# Patient Record
Sex: MALE | Race: WHITE | Employment: OTHER | URBAN - METROPOLITAN AREA
[De-identification: names, ages, dates, MRNs, and addresses within clinical notes are randomized per-mention and may not be internally consistent; named-entity substitution may affect disease eponyms.]

---

## 2017-01-13 ENCOUNTER — OFFICE VISIT (OUTPATIENT)
Dept: FAMILY MEDICINE CLINIC | Age: 82
End: 2017-01-13

## 2017-01-13 VITALS
HEIGHT: 67 IN | WEIGHT: 192 LBS | SYSTOLIC BLOOD PRESSURE: 114 MMHG | DIASTOLIC BLOOD PRESSURE: 64 MMHG | BODY MASS INDEX: 30.13 KG/M2 | TEMPERATURE: 98.1 F

## 2017-01-13 DIAGNOSIS — Z23 NEED FOR PNEUMOCOCCAL VACCINATION: ICD-10-CM

## 2017-01-13 DIAGNOSIS — E78.00 PURE HYPERCHOLESTEROLEMIA: ICD-10-CM

## 2017-01-13 DIAGNOSIS — I10 ESSENTIAL HYPERTENSION: Primary | ICD-10-CM

## 2017-01-13 DIAGNOSIS — I48.20 CHRONIC ATRIAL FIBRILLATION (HCC): ICD-10-CM

## 2017-01-13 PROCEDURE — 99214 OFFICE O/P EST MOD 30 MIN: CPT | Performed by: INTERNAL MEDICINE

## 2017-01-13 PROCEDURE — 90670 PCV13 VACCINE IM: CPT | Performed by: INTERNAL MEDICINE

## 2017-01-13 PROCEDURE — G0009 ADMIN PNEUMOCOCCAL VACCINE: HCPCS | Performed by: INTERNAL MEDICINE

## 2017-01-13 ASSESSMENT — PATIENT HEALTH QUESTIONNAIRE - PHQ9
SUM OF ALL RESPONSES TO PHQ QUESTIONS 1-9: 0
2. FEELING DOWN, DEPRESSED OR HOPELESS: 0
SUM OF ALL RESPONSES TO PHQ9 QUESTIONS 1 & 2: 0
1. LITTLE INTEREST OR PLEASURE IN DOING THINGS: 0

## 2017-01-13 ASSESSMENT — ENCOUNTER SYMPTOMS
VOMITING: 0
APNEA: 0
SHORTNESS OF BREATH: 0
CONSTIPATION: 0
WHEEZING: 0
BLOOD IN STOOL: 0
COUGH: 0
ABDOMINAL PAIN: 0
NAUSEA: 0

## 2017-02-01 DIAGNOSIS — E78.00 PURE HYPERCHOLESTEROLEMIA: ICD-10-CM

## 2017-02-01 DIAGNOSIS — I10 ESSENTIAL HYPERTENSION: ICD-10-CM

## 2017-02-01 LAB
ALT SERPL-CCNC: 24 U/L (ref 10–40)
ANION GAP SERPL CALCULATED.3IONS-SCNC: 10 MMOL/L (ref 3–16)
AST SERPL-CCNC: 22 U/L (ref 15–37)
BUN BLDV-MCNC: 19 MG/DL (ref 7–20)
CALCIUM SERPL-MCNC: 9 MG/DL (ref 8.3–10.6)
CHLORIDE BLD-SCNC: 105 MMOL/L (ref 99–110)
CHOLESTEROL, TOTAL: 134 MG/DL (ref 0–199)
CO2: 27 MMOL/L (ref 21–32)
CREAT SERPL-MCNC: 1.2 MG/DL (ref 0.8–1.3)
GFR AFRICAN AMERICAN: >60
GFR NON-AFRICAN AMERICAN: 57
GLUCOSE BLD-MCNC: 90 MG/DL (ref 70–99)
HDLC SERPL-MCNC: 50 MG/DL (ref 40–60)
LDL CHOLESTEROL CALCULATED: 56 MG/DL
POTASSIUM SERPL-SCNC: 4.4 MMOL/L (ref 3.5–5.1)
SODIUM BLD-SCNC: 142 MMOL/L (ref 136–145)
TRIGL SERPL-MCNC: 139 MG/DL (ref 0–150)
VLDLC SERPL CALC-MCNC: 28 MG/DL

## 2017-03-23 ENCOUNTER — TELEPHONE (OUTPATIENT)
Dept: FAMILY MEDICINE CLINIC | Age: 82
End: 2017-03-23

## 2017-04-25 ENCOUNTER — TELEPHONE (OUTPATIENT)
Dept: FAMILY MEDICINE CLINIC | Age: 82
End: 2017-04-25

## 2017-06-02 ENCOUNTER — TELEPHONE (OUTPATIENT)
Dept: FAMILY MEDICINE CLINIC | Age: 82
End: 2017-06-02

## 2017-07-10 ENCOUNTER — OFFICE VISIT (OUTPATIENT)
Dept: FAMILY MEDICINE CLINIC | Age: 82
End: 2017-07-10

## 2017-07-10 VITALS
TEMPERATURE: 98 F | BODY MASS INDEX: 30.1 KG/M2 | DIASTOLIC BLOOD PRESSURE: 72 MMHG | SYSTOLIC BLOOD PRESSURE: 110 MMHG | WEIGHT: 191.8 LBS | HEIGHT: 67 IN

## 2017-07-10 DIAGNOSIS — E78.00 PURE HYPERCHOLESTEROLEMIA: Primary | ICD-10-CM

## 2017-07-10 DIAGNOSIS — R53.82 CHRONIC FATIGUE: ICD-10-CM

## 2017-07-10 PROCEDURE — 99213 OFFICE O/P EST LOW 20 MIN: CPT | Performed by: INTERNAL MEDICINE

## 2017-07-10 ASSESSMENT — ENCOUNTER SYMPTOMS
WHEEZING: 0
COUGH: 0
APNEA: 0
ABDOMINAL PAIN: 0

## 2017-07-28 PROBLEM — I48.92 ATRIAL FLUTTER WITH RAPID VENTRICULAR RESPONSE (HCC): Status: ACTIVE | Noted: 2017-07-28

## 2017-07-31 ENCOUNTER — ANTI-COAG VISIT (OUTPATIENT)
Dept: CARDIOLOGY CLINIC | Age: 82
End: 2017-07-31

## 2017-08-01 ENCOUNTER — TELEPHONE (OUTPATIENT)
Dept: FAMILY MEDICINE CLINIC | Age: 82
End: 2017-08-01

## 2017-08-03 ENCOUNTER — TELEPHONE (OUTPATIENT)
Dept: CARDIOLOGY CLINIC | Age: 82
End: 2017-08-03

## 2017-08-03 DIAGNOSIS — I48.0 PAF (PAROXYSMAL ATRIAL FIBRILLATION) (HCC): ICD-10-CM

## 2017-08-03 DIAGNOSIS — I25.10 CORONARY ARTERY DISEASE INVOLVING NATIVE CORONARY ARTERY OF NATIVE HEART WITHOUT ANGINA PECTORIS: ICD-10-CM

## 2017-08-03 LAB
ANION GAP SERPL CALCULATED.3IONS-SCNC: 10 MMOL/L (ref 3–16)
BUN BLDV-MCNC: 33 MG/DL (ref 7–20)
CALCIUM SERPL-MCNC: 9.4 MG/DL (ref 8.3–10.6)
CHLORIDE BLD-SCNC: 104 MMOL/L (ref 99–110)
CO2: 27 MMOL/L (ref 21–32)
CREAT SERPL-MCNC: 1.3 MG/DL (ref 0.8–1.3)
GFR AFRICAN AMERICAN: >60
GFR NON-AFRICAN AMERICAN: 52
GLUCOSE BLD-MCNC: 86 MG/DL (ref 70–99)
INR BLD: 2.25 (ref 0.85–1.15)
POTASSIUM SERPL-SCNC: 4.6 MMOL/L (ref 3.5–5.1)
PROTHROMBIN TIME: 25.4 SEC (ref 9.6–13)
SODIUM BLD-SCNC: 141 MMOL/L (ref 136–145)

## 2017-08-14 ENCOUNTER — OFFICE VISIT (OUTPATIENT)
Dept: FAMILY MEDICINE CLINIC | Age: 82
End: 2017-08-14

## 2017-08-14 VITALS
WEIGHT: 192.2 LBS | SYSTOLIC BLOOD PRESSURE: 122 MMHG | DIASTOLIC BLOOD PRESSURE: 64 MMHG | BODY MASS INDEX: 29.13 KG/M2 | HEIGHT: 68 IN | TEMPERATURE: 97.5 F

## 2017-08-14 DIAGNOSIS — I48.92 ATRIAL FLUTTER WITH RAPID VENTRICULAR RESPONSE (HCC): Primary | ICD-10-CM

## 2017-08-14 PROCEDURE — 99213 OFFICE O/P EST LOW 20 MIN: CPT | Performed by: INTERNAL MEDICINE

## 2017-08-14 ASSESSMENT — ENCOUNTER SYMPTOMS
COUGH: 0
WHEEZING: 0
ABDOMINAL PAIN: 0
APNEA: 0
SHORTNESS OF BREATH: 0

## 2017-08-17 ENCOUNTER — ANTI-COAG VISIT (OUTPATIENT)
Dept: CARDIOLOGY CLINIC | Age: 82
End: 2017-08-17

## 2017-08-17 DIAGNOSIS — I48.0 PAF (PAROXYSMAL ATRIAL FIBRILLATION) (HCC): ICD-10-CM

## 2017-08-17 DIAGNOSIS — I48.0 PAF (PAROXYSMAL ATRIAL FIBRILLATION) (HCC): Primary | ICD-10-CM

## 2017-08-17 LAB
INR BLD: 2.38 (ref 0.85–1.15)
PROTHROMBIN TIME: 26.9 SEC (ref 9.6–13)

## 2017-08-18 ENCOUNTER — TELEPHONE (OUTPATIENT)
Dept: CARDIOLOGY CLINIC | Age: 82
End: 2017-08-18

## 2017-08-22 ENCOUNTER — TELEPHONE (OUTPATIENT)
Dept: FAMILY MEDICINE CLINIC | Age: 82
End: 2017-08-22

## 2017-08-22 ENCOUNTER — OFFICE VISIT (OUTPATIENT)
Dept: CARDIOLOGY CLINIC | Age: 82
End: 2017-08-22

## 2017-08-22 VITALS
HEIGHT: 68 IN | WEIGHT: 191 LBS | BODY MASS INDEX: 28.95 KG/M2 | DIASTOLIC BLOOD PRESSURE: 64 MMHG | SYSTOLIC BLOOD PRESSURE: 126 MMHG | HEART RATE: 96 BPM | OXYGEN SATURATION: 98 %

## 2017-08-22 DIAGNOSIS — I48.0 PAF (PAROXYSMAL ATRIAL FIBRILLATION) (HCC): Primary | ICD-10-CM

## 2017-08-22 DIAGNOSIS — I10 ESSENTIAL HYPERTENSION: ICD-10-CM

## 2017-08-22 DIAGNOSIS — E78.5 HYPERLIPIDEMIA, UNSPECIFIED HYPERLIPIDEMIA TYPE: ICD-10-CM

## 2017-08-22 DIAGNOSIS — I48.19 PERSISTENT ATRIAL FIBRILLATION (HCC): ICD-10-CM

## 2017-08-22 DIAGNOSIS — I25.10 CORONARY ARTERY DISEASE INVOLVING NATIVE CORONARY ARTERY OF NATIVE HEART WITHOUT ANGINA PECTORIS: ICD-10-CM

## 2017-08-22 PROCEDURE — 93000 ELECTROCARDIOGRAM COMPLETE: CPT | Performed by: INTERNAL MEDICINE

## 2017-08-22 PROCEDURE — 99214 OFFICE O/P EST MOD 30 MIN: CPT | Performed by: INTERNAL MEDICINE

## 2017-08-22 RX ORDER — METOPROLOL TARTRATE 50 MG/1
25 TABLET, FILM COATED ORAL 2 TIMES DAILY
Qty: 30 TABLET | Refills: 0 | Status: SHIPPED | OUTPATIENT
Start: 2017-08-22 | End: 2017-10-20 | Stop reason: DRUGHIGH

## 2017-08-25 RX ORDER — PRAVASTATIN SODIUM 10 MG
TABLET ORAL
Qty: 30 TABLET | Refills: 5 | Status: SHIPPED | OUTPATIENT
Start: 2017-08-25 | End: 2018-02-13 | Stop reason: ALTCHOICE

## 2017-09-14 ENCOUNTER — TELEPHONE (OUTPATIENT)
Dept: CARDIOLOGY CLINIC | Age: 82
End: 2017-09-14

## 2017-09-14 DIAGNOSIS — I48.0 PAF (PAROXYSMAL ATRIAL FIBRILLATION) (HCC): ICD-10-CM

## 2017-09-14 LAB
INR BLD: 3.1 (ref 0.85–1.15)
PROTHROMBIN TIME: 35 SEC (ref 9.6–13)

## 2017-09-15 ENCOUNTER — ANTI-COAG VISIT (OUTPATIENT)
Dept: CARDIOLOGY CLINIC | Age: 82
End: 2017-09-15

## 2017-09-15 DIAGNOSIS — I48.19 PERSISTENT ATRIAL FIBRILLATION (HCC): ICD-10-CM

## 2017-09-20 ENCOUNTER — CARE COORDINATION (OUTPATIENT)
Dept: CASE MANAGEMENT | Age: 82
End: 2017-09-20

## 2017-09-21 DIAGNOSIS — I48.0 PAF (PAROXYSMAL ATRIAL FIBRILLATION) (HCC): ICD-10-CM

## 2017-09-21 LAB
INR BLD: 2.24 (ref 0.85–1.15)
PROTHROMBIN TIME: 25.3 SEC (ref 9.6–13)

## 2017-09-22 ENCOUNTER — TELEPHONE (OUTPATIENT)
Dept: CARDIOLOGY CLINIC | Age: 82
End: 2017-09-22

## 2017-09-22 ENCOUNTER — ANTI-COAG VISIT (OUTPATIENT)
Dept: CARDIOLOGY CLINIC | Age: 82
End: 2017-09-22

## 2017-09-22 DIAGNOSIS — I48.19 PERSISTENT ATRIAL FIBRILLATION (HCC): ICD-10-CM

## 2017-09-25 ENCOUNTER — CARE COORDINATION (OUTPATIENT)
Dept: MEDSURG UNIT | Age: 82
End: 2017-09-25

## 2017-09-26 ENCOUNTER — OFFICE VISIT (OUTPATIENT)
Dept: FAMILY MEDICINE CLINIC | Age: 82
End: 2017-09-26

## 2017-09-26 VITALS
HEIGHT: 68 IN | SYSTOLIC BLOOD PRESSURE: 120 MMHG | WEIGHT: 194.8 LBS | BODY MASS INDEX: 29.52 KG/M2 | DIASTOLIC BLOOD PRESSURE: 72 MMHG | TEMPERATURE: 97.8 F

## 2017-09-26 DIAGNOSIS — I48.0 PAF (PAROXYSMAL ATRIAL FIBRILLATION) (HCC): Primary | ICD-10-CM

## 2017-09-26 DIAGNOSIS — Z23 FLU VACCINE NEED: ICD-10-CM

## 2017-09-26 PROCEDURE — 90662 IIV NO PRSV INCREASED AG IM: CPT | Performed by: INTERNAL MEDICINE

## 2017-09-26 PROCEDURE — G0008 ADMIN INFLUENZA VIRUS VAC: HCPCS | Performed by: INTERNAL MEDICINE

## 2017-09-26 PROCEDURE — 99213 OFFICE O/P EST LOW 20 MIN: CPT | Performed by: INTERNAL MEDICINE

## 2017-09-26 ASSESSMENT — ENCOUNTER SYMPTOMS
APNEA: 0
COUGH: 0
ABDOMINAL PAIN: 0

## 2017-09-28 ENCOUNTER — CARE COORDINATION (OUTPATIENT)
Dept: MEDSURG UNIT | Age: 82
End: 2017-09-28

## 2017-10-03 ENCOUNTER — CARE COORDINATION (OUTPATIENT)
Dept: CASE MANAGEMENT | Age: 82
End: 2017-10-03

## 2017-10-03 NOTE — CARE COORDINATION
[facility] 45 Transitions Follow Up Call    10/3/2017    Patient: Gisela Coles  Patient : 1926   MRN: <[id number]>  Reason for Admission: There are no discharge diagnoses documented for the most recent discharge. Discharge Date: 17 RARS: Risk Score: 18.25       Spoke with: [address] Transitions Subsequent and Final Call    Subsequent and Final Calls   Have your medications changed?:  No   Do you have any questions related to your medications?:  No   Do you currently have any active services?:  No   Do you have any needs or concerns that I can assist you with?:  Yes   Patient-reported Needs or Concerns:  red spot on arm   Identified Barriers:  None   Care Transitions Interventions   No Identified Needs   Other Interventions: Follow Up : Contacted patient for follow up CTC call. Pt doing well, no questions regarding plan of care or medications. Pt did mention that he noticed a \"red lump\" on his R forearm that had not been there a few days ago. Pt reports that the area is swollen, red, and warm to touch. He stated that his whole lower arm on the right side looked swollen the more he looked at it. Pt denies fever at this time. Appt scheduled with PCP to eval arm tomorrow. Pt verbalized understanding of appt date, time, and location.      Future Appointments  Date Time Provider Vishal Kirk   10/4/2017 10:00 AM [facility]   10/20/2017 3:45 PM Naresh Gonzales MD [facility] NATHAN Rico RN

## 2017-10-04 ENCOUNTER — OFFICE VISIT (OUTPATIENT)
Dept: FAMILY MEDICINE CLINIC | Age: 82
End: 2017-10-04

## 2017-10-04 VITALS
TEMPERATURE: 98 F | WEIGHT: 194.4 LBS | BODY MASS INDEX: 29.46 KG/M2 | DIASTOLIC BLOOD PRESSURE: 74 MMHG | SYSTOLIC BLOOD PRESSURE: 122 MMHG | HEIGHT: 68 IN

## 2017-10-04 DIAGNOSIS — T14.8XXA HEMATOMA: ICD-10-CM

## 2017-10-04 PROCEDURE — 99213 OFFICE O/P EST LOW 20 MIN: CPT | Performed by: INTERNAL MEDICINE

## 2017-10-04 ASSESSMENT — ENCOUNTER SYMPTOMS
ABDOMINAL PAIN: 0
APNEA: 0
SHORTNESS OF BREATH: 0
WHEEZING: 0

## 2017-10-04 NOTE — PROGRESS NOTES
Subjective:      Patient ID: Audelia Severin is a 80 y.o. male. HPI   Chief Complaint   Patient presents with    Mass     patient c/o \"lump\" and swelling- right arm x 1 week. patient denies injury   present since iv infiltration  Audelia Severin is a 80 y.o. male with the following history as recorded in EpicCare:  Patient Active Problem List    Diagnosis Date Noted    PAF [facility]oxysmal [facility] fibrillation) ([facility]      Priority: High    [facility]     Atrial flutter with rapid ventricular response ([facility] 07/28/2017    Chronic fatigue 07/10/2017    Non-healing skin lesion of nose 04/01/2016    Glaucoma     Gross hematuria 09/30/2014    Cataract 04/23/2014    Hyperlipidemia 12/27/2013    [facility]     Pers[facility]     Essential hypertension     Aorta aneurysm (HCC)     Aorta aneurysm (HCC)     Coronary artery disease involving native coronary artery of native heart without angina pectoris 07/20/2010     Current Outpatient Prescriptions   Medication Sig Dispense Refill    amiodarone (CORDARONE) 200 MG tablet Take 1 tablet by mouth daily 30 tablet 3    warfarin (COUMADIN) 5 MG tablet Indications: managed by Cardiologist Hold dose today and then resume at 2.5 mg daily (0.5 tablet) 30 tablet 3    pravastatin (PRAVACHOL) 10 MG tablet take 1 by mouth daily 30 tablet 5    Cholecalciferol (VITAMIN D PO) Take 5,000 Int'l Units by mouth daily      Multiple Vitamins-Minerals (EYE VITAMINS PO) Take by mouth daily      metoprolol tartrate (LOPRESSOR) 50 MG tablet Take 0.5 tablets by mouth 2 times daily (Patient taking differently: Take 50 mg by mouth 2 times daily ) 30 tablet 0    Handicap Placard MISC by Does not apply route 1 each 0    clopidogrel (PLAVIX) 75 MG tablet Take 75 mg by mouth daily.  nitroGLYCERIN (NITROSTAT) 0.4 MG SL tablet Place 0.4 mg under the tongue every 5 minutes as needed.         timolol (TIMOPTIC) 0.5 %

## 2017-10-04 NOTE — MR AVS SNAPSHOT
After Visit Summary             Rainey Mcardle   10/4/2017 10:00 AM   Office Visit    Description:  Male : 1926   Provider:  Natalia Mijares DO   Department:  [facility]              Your Follow-Up and Future Appointments         Below is a list of your follow-up and future appointments. This may not be a complete list as you may have made appointments directly with providers that we are not aware of or your providers may have made some for you. Please call your providers to confirm appointments. It is important to keep your appointments. Please bring your current insurance card, photo ID, co-pay, and all medication bottles to your appointment. If self-pay, payment is expected at the time of service. Your To-Do List     Future Appointments Provider Department Dept Phone    10/20/2017 3:45 PM Emerson Roland MD [address] [place] [address] [phone]    Please arrive 15 minutes prior to appointment time, bring insurance card and photo ID. Information from Your Visit        Department     Name Address Phone Fax    [facility] [address] [address] [phone]      You Were Seen for:         Comments    Hematoma   [[id number]]         Vital Signs     Blood Pressure Temperature Height Weight Body Mass Index Smoking Status    122/74 (Site: Left Arm, Position: Sitting, Cuff Size: Large Adult) 98 °F (36.7 °C) (Oral) 5' 8\" (1.727 m) 194 lb 6.4 oz (88.2 kg) 29.56 kg/m2 Never Smoker      Additional Information about your Body Mass Index (BMI)           Your BMI as listed above is considered overweight (25.0-29.9). BMI is an estimate of body fat, calculated from your height and weight. The higher your BMI, the greater your risk of heart disease, high blood pressure, type 2 diabetes, stroke, gallstones, arthritis, sleep apnea, and certain cancers. BMI is not perfect.   It may overestimate body fat in athletes and people who are more muscular. If your body fat is high you can improve your BMI by decreasing your calorie intake and becoming more physically active. Learn more at: [employer]             Medications and Orders      Your Current Medications Are              amiodarone (CORDARONE) 200 MG tablet Take 1 tablet by mouth daily    warfarin (COUMADIN) 5 MG tablet Indications: managed by Cardiologist Hold dose today and then resume at 2.5 mg daily (0.5 tablet)    pravastatin (PRAVACHOL) 10 MG tablet take 1 by mouth daily    Cholecalciferol (VITAMIN D PO) Take 5,000 Int'l Units by mouth daily    Multiple Vitamins-Minerals (EYE VITAMINS PO) Take by mouth daily    metoprolol tartrate (LOPRESSOR) 50 MG tablet Take 0.5 tablets by mouth 2 times daily    Handicap Placard MISC by Does not apply route    clopidogrel (PLAVIX) 75 MG tablet Take 75 mg by mouth daily. nitroGLYCERIN (NITROSTAT) 0.4 MG SL tablet Place 0.4 mg under the tongue every 5 minutes as needed. timolol (TIMOPTIC) 0.5 % ophthalmic solution 1 drop daily.       Allergies              Lipitor [Atorvastatin]     Sulfa Antibiotics          Additional Information        Basic Information     Date Of Birth Sex Race Ethnicity Preferred Language    1/11/1926 Male White Non-/Non  English      Problem List as of 10/4/2017  Date Reviewed: 9/26/2017                PAF (paroxysmal atrial fibrillation) (HCC)    Hematoma    Atrial fibrillation with RVR (HCC)    Atrial flutter with rapid ventricular response (HCC)    Chronic fatigue    Non-healing skin lesion of nose    Glaucoma    Gross hematuria    Cataract    Hyperlipidemia    Cancer of prostate ([facility]    Persistent atrial fibrillation [facility])    Essential hypertension    Aorta aneurysm ([facility]    Aorta aneurysm ([facility]    Coronary artery disease involving native coronary artery of native heart without angina pectoris      Immunizations as of 10/4/2017     Name Date Influenza Vaccine, unspecified formulation 10/16/2015    Influenza, High Dose 9/26/2017, 12/1/2016    Pneumococcal 13-valent Conjugate (Cavrbpb19) 1/13/2017    Pneumococcal Polysaccharide ([id number]) 12/2/2011      Preventive Care        Date Due    Tetanus Combination Vaccine (1 - Tdap) 1/13/2018 (Originally 1/11/1945)            Sushma Finn allows you to send messages to your doctor, view your test results, renew your prescriptions, schedule appointments, view visit notes, and more. How Do I Sign Up? 1. In your Internet browser, go to [email or web address]  2. Click on the Sign Up Now link in the Sign In box. You will see the New Member Sign Up page. 3. Enter your [employer] Access Code exactly as it appears below. You will not need to use this code after youve completed the sign-up process. If you do not sign up before the expiration date, you must request a new code. [employer] Access Code: [email or web address]  Expires: 11/18/2017 12:18 PM    4. Enter your Social Security Number ([id number]) and Date of Birth (mm/dd/yyyy) as indicated and click Submit. You will be taken to the next sign-up page. 5. Create a [employer] ID. This will be your [employer] login ID and cannot be changed, so think of one that is secure and easy to remember. 6. Create a [employer] password. You can change your password at any time. 7. Enter your Password Reset Question and Answer. This can be used at a later time if you forget your password. 8. Enter your e-mail address. You will receive e-mail notification when new information is available in [address] 9. Click Sign Up. You can now view your medical record. Additional Information  If you have questions, please contact the physician practice where you receive care. Remember, [employer] is NOT to be used for urgent needs. For medical emergencies, dial 911. For questions regarding your [employer] account call [phone].  If you

## 2017-10-19 ENCOUNTER — ANTI-COAG VISIT (OUTPATIENT)
Dept: CARDIOLOGY CLINIC | Age: 82
End: 2017-10-19

## 2017-10-19 ENCOUNTER — TELEPHONE (OUTPATIENT)
Dept: CARDIOLOGY CLINIC | Age: 82
End: 2017-10-19

## 2017-10-19 DIAGNOSIS — I48.0 PAF (PAROXYSMAL ATRIAL FIBRILLATION) (HCC): ICD-10-CM

## 2017-10-19 LAB
INR BLD: 4.6 (ref 0.85–1.15)
PROTHROMBIN TIME: 52 SEC (ref 9.6–13)

## 2017-10-19 NOTE — TELEPHONE ENCOUNTER
Lab called in for a high INR reading on Dr. Grover [place] patient. INR - 4.60    Please call pt to advise. Thank you.

## 2017-10-20 ENCOUNTER — OFFICE VISIT (OUTPATIENT)
Dept: CARDIOLOGY CLINIC | Age: 82
End: 2017-10-20

## 2017-10-20 VITALS
SYSTOLIC BLOOD PRESSURE: 120 MMHG | HEART RATE: 54 BPM | OXYGEN SATURATION: 98 % | DIASTOLIC BLOOD PRESSURE: 68 MMHG | WEIGHT: 196 LBS | HEIGHT: 68 IN | BODY MASS INDEX: 29.7 KG/M2

## 2017-10-20 DIAGNOSIS — Z79.899 LONG TERM CURRENT USE OF AMIODARONE: ICD-10-CM

## 2017-10-20 DIAGNOSIS — I48.0 PAF (PAROXYSMAL ATRIAL FIBRILLATION) (HCC): Primary | ICD-10-CM

## 2017-10-20 DIAGNOSIS — I25.10 CORONARY ARTERY DISEASE INVOLVING NATIVE CORONARY ARTERY OF NATIVE HEART WITHOUT ANGINA PECTORIS: ICD-10-CM

## 2017-10-20 PROCEDURE — 1123F ACP DISCUSS/DSCN MKR DOCD: CPT | Performed by: INTERNAL MEDICINE

## 2017-10-20 PROCEDURE — 4040F PNEUMOC VAC/ADMIN/RCVD: CPT | Performed by: INTERNAL MEDICINE

## 2017-10-20 PROCEDURE — 93000 ELECTROCARDIOGRAM COMPLETE: CPT | Performed by: INTERNAL MEDICINE

## 2017-10-20 PROCEDURE — 1036F TOBACCO NON-USER: CPT | Performed by: INTERNAL MEDICINE

## 2017-10-20 PROCEDURE — G8427 DOCREV CUR MEDS BY ELIG CLIN: HCPCS | Performed by: INTERNAL MEDICINE

## 2017-10-20 PROCEDURE — G8484 FLU IMMUNIZE NO ADMIN: HCPCS | Performed by: INTERNAL MEDICINE

## 2017-10-20 PROCEDURE — G8417 CALC BMI ABV UP PARAM F/U: HCPCS | Performed by: INTERNAL MEDICINE

## 2017-10-20 PROCEDURE — G8598 ASA/ANTIPLAT THER USED: HCPCS | Performed by: INTERNAL MEDICINE

## 2017-10-20 PROCEDURE — 99214 OFFICE O/P EST MOD 30 MIN: CPT | Performed by: INTERNAL MEDICINE

## 2017-10-20 RX ORDER — METOPROLOL TARTRATE 50 MG/1
25 TABLET, FILM COATED ORAL 2 TIMES DAILY
Qty: 30 TABLET | Refills: 0 | Status: SHIPPED | OUTPATIENT
Start: 2017-10-20 | End: 2018-01-12 | Stop reason: SDUPTHER

## 2017-10-20 NOTE — PROGRESS NOTES
Medication Sig Dispense Refill    amiodarone (CORDARONE) 200 MG tablet Take 1 tablet by mouth daily 30 tablet 3    warfarin (COUMADIN) 5 MG tablet Indications: managed by Cardiologist Hold dose today and then resume at 2.5 mg daily (0.5 tablet) 30 tablet 3    pravastatin (PRAVACHOL) 10 MG tablet take 1 by mouth daily 30 tablet 5    Cholecalciferol (VITAMIN D PO) Take 5,000 Int'l Units by mouth daily      Multiple Vitamins-Minerals (EYE VITAMINS PO) Take by mouth daily      metoprolol tartrate (LOPRESSOR) 50 MG tablet Take 0.5 tablets by mouth 2 times daily (Patient taking differently: Take 50 mg by mouth 2 times daily ) 30 tablet 0    Handicap Placard MISC by Does not apply route 1 each 0    clopidogrel (PLAVIX) 75 MG tablet Take 75 mg by mouth daily.  nitroGLYCERIN (NITROSTAT) 0.4 MG SL tablet Place 0.4 mg under the tongue every 5 minutes as needed.  timolol (TIMOPTIC) 0.5 % ophthalmic solution 1 drop daily. No current facility-administered medications for this visit. Review of Systems:  Review of systems is as detailed above and otherwise all other systems are negative. Physical Exam:   /68   Pulse 54   Ht 5' 8\" (1.727 m)   Wt 196 lb (88.9 kg) Comment: did not want remove shoes  SpO2 98%   BMI 29.80 kg/m²   Wt Readings from Last 3 Encounters:   10/20/17 196 lb (88.9 kg)   10/04/17 194 lb 6.4 oz (88.2 kg)   09/26/17 194 lb 12.8 oz (88.4 kg)     Constitutional: He is oriented to person, place, and time. He appears well-developed and well-nourished. In no acute distress. Head: Normocephalic and atraumatic. Pupils equal and round. Neck: Neck supple. No JVP or carotid bruit appreciated. No mass and no thyromegaly present. No lymphadenopathy present. Cardiovascular: Irreg/Irregrate. Normal heart sounds. Exam reveals no gallop and no friction rub. No murmur heard. Pulmonary/Chest: Effort normal and breath sounds normal. No respiratory distress.  He has no wheezes, stable. Hyperlipidemia  Last lipid profile from 2/17 was WNL. LDL goal <70. Continue Pravastatin. Patient to follow up in 4 months. Thank you very much for allowing me to participate in the care of your patient. Please do not hesitate to contact me if you have any questions.     Sincerely,  Dipesh Reed MD      [facility], [address] [facility]  Ph: [phone]  Fax: [phone]

## 2017-10-20 NOTE — PATIENT INSTRUCTIONS
shot. If you have had one before, ask your doctor whether you need another dose. Get a flu shot every year. If you must be around people with colds or flu, wash your hands often. Activity  · If your doctor recommends it, get more exercise. Walking is a good choice. Bit by bit, increase the amount you walk every day. Try for at least 30 minutes on most days of the week. You also may want to swim, bike, or do other activities. Your doctor may suggest that you join a cardiac rehabilitation program so that you can have help increasing your physical activity safely. · Start light exercise if your doctor says it is okay. Even a small amount will help you get stronger, have more energy, and manage stress. Walking is an easy way to get exercise. Start out by walking a little more than you did in the hospital. Gradually increase the amount you walk. · When you exercise, watch for signs that your heart is working too hard. You are pushing too hard if you cannot talk while you are exercising. If you become short of breath or dizzy or have chest pain, sit down and rest immediately. · Check your pulse regularly. Place two fingers on the artery at the palm side of your wrist, in line with your thumb. If your heartbeat seems uneven or fast, talk to your doctor. When should you call for help? Call 911 anytime you think you may need emergency care. For example, call if:  · You have symptoms of a heart attack. These may include:  ¨ Chest pain or pressure, or a strange feeling in the chest.  ¨ Sweating. ¨ Shortness of breath. ¨ Nausea or vomiting. ¨ Pain, pressure, or a strange feeling in the back, neck, jaw, or upper belly or in one or both shoulders or arms. ¨ Lightheadedness or sudden weakness. ¨ A fast or irregular heartbeat. After you call 911, the  may tell you to chew 1 adult-strength or 2 to 4 low-dose aspirin. Wait for an ambulance. Do not try to drive yourself. · You have symptoms of a stroke.  These may

## 2017-10-23 DIAGNOSIS — I48.0 PAF (PAROXYSMAL ATRIAL FIBRILLATION) (HCC): ICD-10-CM

## 2017-10-23 LAB
INR BLD: 1.83 (ref 0.85–1.15)
PROTHROMBIN TIME: 20.7 SEC (ref 9.6–13)

## 2017-10-24 ENCOUNTER — TELEPHONE (OUTPATIENT)
Dept: CARDIOLOGY CLINIC | Age: 82
End: 2017-10-24

## 2017-10-31 ENCOUNTER — ANTI-COAG VISIT (OUTPATIENT)
Dept: PHARMACY | Facility: CLINIC | Age: 82
End: 2017-10-31

## 2017-10-31 DIAGNOSIS — I48.19 PERSISTENT ATRIAL FIBRILLATION (HCC): ICD-10-CM

## 2017-10-31 LAB — INR BLD: 3.7

## 2017-10-31 RX ORDER — WARFARIN SODIUM 5 MG/1
2.5 TABLET ORAL DAILY
COMMUNITY
End: 2018-02-09 | Stop reason: ALTCHOICE

## 2017-11-10 ENCOUNTER — ANTI-COAG VISIT (OUTPATIENT)
Dept: PHARMACY | Facility: CLINIC | Age: 82
End: 2017-11-10

## 2017-11-10 LAB
INR BLD: 3.4
PROTIME: 40.7 SECONDS

## 2017-11-17 ENCOUNTER — ANTI-COAG VISIT (OUTPATIENT)
Dept: PHARMACY | Facility: CLINIC | Age: 82
End: 2017-11-17

## 2017-11-17 LAB
INR BLD: 3.2
PROTIME: 38.2 SECONDS

## 2017-12-01 ENCOUNTER — TELEPHONE (OUTPATIENT)
Dept: CARDIOLOGY CLINIC | Age: 82
End: 2017-12-01

## 2017-12-01 ENCOUNTER — ANTI-COAG VISIT (OUTPATIENT)
Dept: PHARMACY | Facility: CLINIC | Age: 82
End: 2017-12-01

## 2017-12-01 ENCOUNTER — TELEPHONE (OUTPATIENT)
Dept: PHARMACY | Facility: CLINIC | Age: 82
End: 2017-12-01

## 2017-12-01 NOTE — TELEPHONE ENCOUNTER
Call to patient to discuss the INR lab draw from 11-30-17. Informed patient I have the results from yesterday's lab draw (2.8) and he does not need to come in today for his INR finger stick. Instructed patient to continue his current warfarin dosing of 2.5 mg daily except NO warfarin on Tuesday. Scheduled next appointment for 12-29-17 at 9:15 AM.  Instructed patient to continue having his vitamin k greens once weekly. Therapy Plan: if patient does not want to continue the vitamin K greens weekly then we may want to decrease dosing to 2.5 mg daily except 1.25 mg on M/W/F. He will need to change his warfarin tablet to 2.5 mg tablets instead of the 5 mg tablets he has at home.

## 2017-12-01 NOTE — TELEPHONE ENCOUNTER
Requested patient to return call to [facility] for dosing. He stated they already called him. Instructed patient to follow up with them. Patient verbalized and confirmed understanding.

## 2017-12-29 ENCOUNTER — ANTI-COAG VISIT (OUTPATIENT)
Dept: PHARMACY | Facility: CLINIC | Age: 82
End: 2017-12-29

## 2017-12-29 ENCOUNTER — TELEPHONE (OUTPATIENT)
Dept: PHARMACY | Facility: CLINIC | Age: 82
End: 2017-12-29

## 2017-12-29 LAB — INTERNATIONAL NORMALIZATION RATIO, POC: 3.2

## 2017-12-29 NOTE — TELEPHONE ENCOUNTER
Called and left message for patient regarding his taking CoQ10. Informed patient it would affect his warfarin dosing as well as his Plavix dosing. He should discuss this with his physician before continuing this OTC medication. Left #[phone] to call on Tuesday afternoon if he should have questions.

## 2017-12-29 NOTE — PROGRESS NOTES
Mr. Brown Bee is a 80 y.o.  male with history of persistent A-FIB who presents today for anticoagulation monitoring and adjustment. Patient verifies current dosing regimen  Patient denies s/s bleeding/bruising/swelling/SOB  No blood in urine or stool. No dietary changes. No changes in medication/OTC agents/Herbals. No change in alcohol use. No missed doses. No Procedures scheduled in the future at this time. Lab Results   Component Value Date    INR 3.2 12/29/2017    INR 2.81 (H) 11/30/2017    INR 3.2 11/17/2017     Patient states doing well. No complaints at this time. Reduced warfarin dose by 16 % to 2.5 mg daily except NO warfarin on Tuesdays and Fridays. Patient states he is looking into switching to [place]. We reviewed the differences between warfarin and Xeralto. He is going to research with his insurance company to determine the coverage before he discusses it with his MD.  To be seen again in three weeks. May need to change his tablet size to 2.5 mg tablets. After visit summary printed and reviewed with patient.

## 2018-01-15 RX ORDER — METOPROLOL TARTRATE 50 MG/1
TABLET, FILM COATED ORAL
Qty: 45 TABLET | Refills: 11 | Status: SHIPPED | OUTPATIENT
Start: 2018-01-15 | End: 2018-02-13 | Stop reason: SDUPTHER

## 2018-01-19 ENCOUNTER — ANTI-COAG VISIT (OUTPATIENT)
Dept: PHARMACY | Facility: CLINIC | Age: 83
End: 2018-01-19

## 2018-01-19 LAB — INTERNATIONAL NORMALIZATION RATIO, POC: 2.9

## 2018-01-19 NOTE — PROGRESS NOTES
Mr. Maura De Luna is a 80 y.o.  male with history of persistent A-Fib who presents today for anticoagulation monitoring and adjustment. Patient verifies current dosing regimen  Patient denies s/s bleeding/bruising/swelling/SOB  No blood in urine or stool. No dietary changes. No changes in medication/OTC agents/Herbals. No change in alcohol use. No missed doses. No Procedures scheduled in the future at this time. Lab Results   Component Value Date    INR 2.9 01/19/2018    INR 3.2 12/29/2017    INR 2.81 (H) 11/30/2017     Patient states doing well. Patient states he has had diarrhea for the past week or so. No noticeable blood in stool. Instructed him to call MD ( Dr. Dexter Solo) to inform. He may need to see MD.   He states he has not been eating his vitamin K greens once a week. Instructed him to continue to have a portion of these once weekly along with his [employer] salad on Saturdays. No change to warfarin weekly dosing of 2.5 mg daily EXCEPT no warfarin on Tuesday and Friday. He states he is not going to change to [place] after doing research on the drug. To be seen again in 4 weeks. After visit summary printed and reviewed with patient.

## 2018-01-23 ENCOUNTER — OFFICE VISIT (OUTPATIENT)
Dept: FAMILY MEDICINE CLINIC | Age: 83
End: 2018-01-23

## 2018-01-23 VITALS
HEIGHT: 68 IN | WEIGHT: 191 LBS | SYSTOLIC BLOOD PRESSURE: 124 MMHG | DIASTOLIC BLOOD PRESSURE: 78 MMHG | TEMPERATURE: 97.5 F | BODY MASS INDEX: 28.95 KG/M2

## 2018-01-23 DIAGNOSIS — R19.7 DIARRHEA OF PRESUMED INFECTIOUS ORIGIN: ICD-10-CM

## 2018-01-23 DIAGNOSIS — E78.5 HYPERLIPIDEMIA, UNSPECIFIED HYPERLIPIDEMIA TYPE: ICD-10-CM

## 2018-01-23 DIAGNOSIS — I10 ESSENTIAL HYPERTENSION: Primary | ICD-10-CM

## 2018-01-23 PROCEDURE — G8598 ASA/ANTIPLAT THER USED: HCPCS | Performed by: INTERNAL MEDICINE

## 2018-01-23 PROCEDURE — G8417 CALC BMI ABV UP PARAM F/U: HCPCS | Performed by: INTERNAL MEDICINE

## 2018-01-23 PROCEDURE — 1036F TOBACCO NON-USER: CPT | Performed by: INTERNAL MEDICINE

## 2018-01-23 PROCEDURE — G8427 DOCREV CUR MEDS BY ELIG CLIN: HCPCS | Performed by: INTERNAL MEDICINE

## 2018-01-23 PROCEDURE — 4040F PNEUMOC VAC/ADMIN/RCVD: CPT | Performed by: INTERNAL MEDICINE

## 2018-01-23 PROCEDURE — G8484 FLU IMMUNIZE NO ADMIN: HCPCS | Performed by: INTERNAL MEDICINE

## 2018-01-23 PROCEDURE — 99214 OFFICE O/P EST MOD 30 MIN: CPT | Performed by: INTERNAL MEDICINE

## 2018-01-23 PROCEDURE — 1123F ACP DISCUSS/DSCN MKR DOCD: CPT | Performed by: INTERNAL MEDICINE

## 2018-01-23 ASSESSMENT — ENCOUNTER SYMPTOMS
SHORTNESS OF BREATH: 0
CONSTIPATION: 0
RHINORRHEA: 0
BLOOD IN STOOL: 0
APNEA: 0
DIARRHEA: 1
ABDOMINAL DISTENTION: 0

## 2018-01-23 ASSESSMENT — PATIENT HEALTH QUESTIONNAIRE - PHQ9
2. FEELING DOWN, DEPRESSED OR HOPELESS: 0
SUM OF ALL RESPONSES TO PHQ9 QUESTIONS 1 & 2: 0
SUM OF ALL RESPONSES TO PHQ QUESTIONS 1-9: 0
1. LITTLE INTEREST OR PLEASURE IN DOING THINGS: 0

## 2018-01-23 NOTE — PROGRESS NOTES
mouth daily      Multiple Vitamins-Minerals (EYE VITAMINS PO) Take by mouth daily      Handicap Placard MISC by Does not apply route 1 each 0    clopidogrel (PLAVIX) 75 MG tablet Take 75 mg by mouth daily.  nitroGLYCERIN (NITROSTAT) 0.4 MG SL tablet Place 0.4 mg under the tongue every 5 minutes as needed.  timolol (TIMOPTIC) 0.5 % ophthalmic solution 1 drop daily. No current facility-administered medications for this visit. Allergies: Lipitor [atorvastatin] and Sulfa antibiotics  Past Medical History:   Diagnosis Date    [facility]     Atrial fibrillation (HCC)     CA - cancer of prostate     CAD (coronary artery disease) 2004    stents    Cancer ([facility]     skin    Hyperlipidemia     Hypertension      Past Surgical History:   Procedure Laterality Date    APPENDECTOMY      CATARACT REMOVAL WITH IMPLANT Bilateral     CORONARY ANGIOPLASTY WITH STENT PLACEMENT      four    HERNIA REPAIR      TONSILLECTOMY      TYMPANOSTOMY TUBE PLACEMENT  2011    right     Family History   Problem Relation Age of Onset    Cancer Mother      colon     Social History   Substance Use Topics    Smoking status: Never Smoker    Smokeless tobacco: Never Used    Alcohol use Yes      Comment: very rare       Review of Systems   Constitutional: Negative for chills and diaphoresis. HENT: Negative for congestion, postnasal drip and rhinorrhea. Eyes: Negative for visual disturbance. Respiratory: Negative for apnea and shortness of breath. Cardiovascular: Negative for chest pain and palpitations. Gastrointestinal: Positive for diarrhea. Negative for abdominal distention, blood in stool and constipation. Genitourinary: Negative for dysuria and frequency. Objective:   Physical Exam   Constitutional: He is oriented to person, place, and time. He appears well-developed and well-nourished. HENT:   Head: Normocephalic and atraumatic.    Eyes: Conjunctivae are normal. Pupils are equal,

## 2018-01-25 DIAGNOSIS — E78.5 HYPERLIPIDEMIA, UNSPECIFIED HYPERLIPIDEMIA TYPE: ICD-10-CM

## 2018-01-25 DIAGNOSIS — I10 ESSENTIAL HYPERTENSION: ICD-10-CM

## 2018-01-25 LAB
ALT SERPL-CCNC: 10 U/L (ref 10–40)
ANION GAP SERPL CALCULATED.3IONS-SCNC: 9 MMOL/L (ref 3–16)
AST SERPL-CCNC: 15 U/L (ref 15–37)
BUN BLDV-MCNC: 19 MG/DL (ref 7–20)
CALCIUM SERPL-MCNC: 8.3 MG/DL (ref 8.3–10.6)
CHLORIDE BLD-SCNC: 108 MMOL/L (ref 99–110)
CHOLESTEROL, TOTAL: 172 MG/DL (ref 0–199)
CO2: 23 MMOL/L (ref 21–32)
CREAT SERPL-MCNC: 1.2 MG/DL (ref 0.8–1.3)
GFR AFRICAN AMERICAN: >60
GFR NON-AFRICAN AMERICAN: 57
GLUCOSE BLD-MCNC: 94 MG/DL (ref 70–99)
HDLC SERPL-MCNC: 44 MG/DL (ref 40–60)
LDL CHOLESTEROL CALCULATED: 100 MG/DL
POTASSIUM SERPL-SCNC: 4.9 MMOL/L (ref 3.5–5.1)
SODIUM BLD-SCNC: 140 MMOL/L (ref 136–145)
TRIGL SERPL-MCNC: 141 MG/DL (ref 0–150)
VLDLC SERPL CALC-MCNC: 28 MG/DL

## 2018-02-06 RX ORDER — AMIODARONE HYDROCHLORIDE 200 MG/1
200 TABLET ORAL DAILY
Qty: 90 TABLET | Refills: 3 | Status: SHIPPED | OUTPATIENT
Start: 2018-02-06 | End: 2018-02-13 | Stop reason: SDUPTHER

## 2018-02-09 ENCOUNTER — ANTI-COAG VISIT (OUTPATIENT)
Dept: PHARMACY | Facility: CLINIC | Age: 83
End: 2018-02-09

## 2018-02-09 ENCOUNTER — TELEPHONE (OUTPATIENT)
Dept: PHARMACY | Facility: CLINIC | Age: 83
End: 2018-02-09

## 2018-02-09 LAB — INTERNATIONAL NORMALIZATION RATIO, POC: 3.4

## 2018-02-09 RX ORDER — WARFARIN SODIUM 2.5 MG/1
1.25 TABLET ORAL DAILY
Status: ON HOLD | COMMUNITY
End: 2018-05-04 | Stop reason: HOSPADM

## 2018-02-09 NOTE — TELEPHONE ENCOUNTER
Call to [facility] # [phone]    Spoke with Tati Franklin. Ph.     Warfarin 2.5 mg tablet    # 20 tablets    Sig: Take 1.25 mg daily except 2.5 mg on Mondays and Thursdays or as directed by the [facility] clinic # [phone].      NR    To: Dr. Candice Singer

## 2018-02-13 ENCOUNTER — OFFICE VISIT (OUTPATIENT)
Dept: CARDIOLOGY CLINIC | Age: 83
End: 2018-02-13

## 2018-02-13 VITALS
HEIGHT: 68 IN | OXYGEN SATURATION: 97 % | WEIGHT: 191 LBS | DIASTOLIC BLOOD PRESSURE: 66 MMHG | BODY MASS INDEX: 28.95 KG/M2 | HEART RATE: 46 BPM | SYSTOLIC BLOOD PRESSURE: 134 MMHG

## 2018-02-13 DIAGNOSIS — I48.0 PAF (PAROXYSMAL ATRIAL FIBRILLATION) (HCC): Primary | ICD-10-CM

## 2018-02-13 DIAGNOSIS — E78.2 MIXED HYPERLIPIDEMIA: ICD-10-CM

## 2018-02-13 DIAGNOSIS — I25.10 CORONARY ARTERY DISEASE INVOLVING NATIVE CORONARY ARTERY OF NATIVE HEART WITHOUT ANGINA PECTORIS: ICD-10-CM

## 2018-02-13 DIAGNOSIS — Z79.899 ON AMIODARONE THERAPY: ICD-10-CM

## 2018-02-13 DIAGNOSIS — I10 ESSENTIAL HYPERTENSION: ICD-10-CM

## 2018-02-13 PROCEDURE — G8417 CALC BMI ABV UP PARAM F/U: HCPCS | Performed by: INTERNAL MEDICINE

## 2018-02-13 PROCEDURE — 1036F TOBACCO NON-USER: CPT | Performed by: INTERNAL MEDICINE

## 2018-02-13 PROCEDURE — G8598 ASA/ANTIPLAT THER USED: HCPCS | Performed by: INTERNAL MEDICINE

## 2018-02-13 PROCEDURE — G8427 DOCREV CUR MEDS BY ELIG CLIN: HCPCS | Performed by: INTERNAL MEDICINE

## 2018-02-13 PROCEDURE — 4040F PNEUMOC VAC/ADMIN/RCVD: CPT | Performed by: INTERNAL MEDICINE

## 2018-02-13 PROCEDURE — G8484 FLU IMMUNIZE NO ADMIN: HCPCS | Performed by: INTERNAL MEDICINE

## 2018-02-13 PROCEDURE — 99215 OFFICE O/P EST HI 40 MIN: CPT | Performed by: INTERNAL MEDICINE

## 2018-02-13 PROCEDURE — 93000 ELECTROCARDIOGRAM COMPLETE: CPT | Performed by: INTERNAL MEDICINE

## 2018-02-13 PROCEDURE — 1123F ACP DISCUSS/DSCN MKR DOCD: CPT | Performed by: INTERNAL MEDICINE

## 2018-02-13 RX ORDER — ATORVASTATIN CALCIUM 20 MG/1
20 TABLET, FILM COATED ORAL EVERY OTHER DAY
Qty: 90 TABLET | Refills: 3 | Status: SHIPPED | OUTPATIENT
Start: 2018-02-13

## 2018-02-13 RX ORDER — AMIODARONE HYDROCHLORIDE 200 MG/1
100 TABLET ORAL DAILY
Qty: 45 TABLET | Refills: 3 | Status: ON HOLD
Start: 2018-02-13 | End: 2018-05-04 | Stop reason: HOSPADM

## 2018-02-13 NOTE — PROGRESS NOTES
[address]      Cardiology Follow Up    [address] [place] [address]  1/11/1926 February 13, 2018    CC: \" I have some lower back pain    HPI:  The patient is 80 y.o. male with H/O CAD, PAF, HTN that previously followed with Dr. Marc Terry. He presented to the ED on 7/31/17 with elevated heart rate. He was seen by his PCP and noted to have an elevated HR and was sent to the ED. He was admitted with rapid atrial fib. On chronic Warfarin. He was placed on IV Cardizem and was transitioned to  Lopressor PO. He did convert back to NSR. He was readmitted 9/16/2017 with recurrent atrial fib with RVR which converted to NSR. He is currently taking amiodarone. He is here in follow up for his CAD, PAF, HTN. He is accompanied by a friend and states he is doing pretty well. He is having some lower back pain, it comes and goes and he believes it is related to his statin medication since it improved when he missed a few doses of his statin therapy. Otherwise he is compliant with his other medications. He denies CP, pressure, tightness, edema, SOB, heart racing, palpitations,  PND or orthopnea. He continues to follow in the coumadin clinic.               Past Medical History:   Diagnosis Date    [facility]     Atrial fibrillation (HCC)     CA - cancer of prostate     CAD (coronary artery disease) 2004    stents    Cancer ([facility]     skin    Hyperlipidemia     Hypertension      Past Surgical History:   Procedure Laterality Date    APPENDECTOMY      CATARACT REMOVAL WITH IMPLANT Bilateral     CORONARY ANGIOPLASTY WITH STENT PLACEMENT      four    HERNIA REPAIR      TONSILLECTOMY      TYMPANOSTOMY TUBE PLACEMENT  2011    right     Family History   Problem Relation Age of Onset    Cancer Mother      colon     Social History   Substance Use Topics    Smoking status: Never Smoker    Smokeless tobacco: Never Used    Alcohol use Yes      Comment: very rare       Allergies   Allergen Reactions   

## 2018-02-13 NOTE — PATIENT INSTRUCTIONS
Patient Education        Atrial Fibrillation: Care Instructions  Your Care Instructions    Atrial fibrillation is an irregular and often fast heartbeat. Treating this condition is important for several reasons. It can cause blood clots, which can travel from your heart to your brain and cause a stroke. If you have a fast heartbeat, you may feel lightheaded, dizzy, and weak. An irregular heartbeat can also increase your risk for heart failure. Atrial fibrillation is often the result of another heart condition, such as high blood pressure or coronary artery disease. Making changes to improve your heart condition will help you stay healthy and active. Follow-up care is a key part of your treatment and safety. Be sure to make and go to all appointments, and call your doctor if you are having problems. It's also a good idea to know your test results and keep a list of the medicines you take. How can you care for yourself at home? Medicines  ? · Take your medicines exactly as prescribed. Call your doctor if you think you are having a problem with your medicine. You will get more details on the specific medicines your doctor prescribes. ? · If your doctor has given you a blood thinner to prevent a stroke, be sure you get instructions about how to take your medicine safely. Blood thinners can cause serious bleeding problems. ? · Do not take any vitamins, over-the-counter drugs, or herbal products without talking to your doctor first.   ? Lifestyle changes  ? · Do not smoke. Smoking can increase your chance of a stroke and heart attack. If you need help quitting, talk to your doctor about stop-smoking programs and medicines. These can increase your chances of quitting for good. ? · Eat a heart-healthy diet. ? · Stay at a healthy weight. Lose weight if you need to.   ? · Limit alcohol to 2 drinks a day for men and 1 drink a day for women. Too much alcohol can cause health problems. ? · Avoid colds and flu.  Get

## 2018-02-13 NOTE — LETTER
01/23/18 191 lb (86.6 kg)   10/20/17 196 lb (88.9 kg)     Constitutional: He is oriented to person, place, and time. He appears well-developed and well-nourished. In no acute distress. Head: Normocephalic and atraumatic. Pupils equal and round. Neck: Neck supple. No JVP or carotid bruit appreciated. No mass and no thyromegaly present. No lymphadenopathy present. Cardiovascular: Irreg/Irregrate. Normal heart sounds. Exam reveals no gallop and no friction rub. No murmur heard. Pulmonary/Chest: Effort normal and breath sounds normal. No respiratory distress. He has no wheezes, rhonchi or rales. Abdominal: Soft, non-tender. Bowel sounds are normal. He exhibits no organomegaly, mass or bruit. Extremities: No edema, cyanosis, or clubbing. Pulses are 2+ radial/dorsalis pedis/posterior tibial/carotid bilaterally. Neurological: No gross cranial nerve deficit. Coordination normal.   Skin: Skin is warm and dry. There is no rash or diaphoresis. Psychiatric: He has a normal mood and affect. His speech is normal and behavior is normal.     Lab Review:   FLP:    Lab Results   Component Value Date    TRIG 141 01/25/2018    HDL 44 01/25/2018    HDL 46 04/27/2012    LDLCALC 100 01/25/2018    LABVLDL 28 01/25/2018     BUN/Creatinine:    Lab Results   Component Value Date    BUN 19 01/25/2018    CREATININE 1.2 01/25/2018     EKG Interpretation: 8/22/17, atrial fibrillation RVR  10/20/17 SB, PAC  2/13/18 Marked sinus  Bradycardia     Image Review:     ECHO 7/29/17  Summary  Normal left ventricle size, wall thickness and systolic function with an  estimated ejection fraction of 55%. Thickened mitral leaflets. Mild mitral regurgitation is present. The left atrium is mildly dilated. Cardiac cath 8/2014  PCI/ DOLORES to 90% Prox diagonal branch. Patent LAD stent. 50-60% Prox LCX. LCX branch stent patent. 30% prox RCA, 40% mid RCA. PCI/ DOLORES to 90% PLB.  EF 60%    Assessment/Plan:

## 2018-02-16 ENCOUNTER — TELEPHONE (OUTPATIENT)
Dept: CARDIOLOGY CLINIC | Age: 83
End: 2018-02-16

## 2018-02-16 NOTE — TELEPHONE ENCOUNTER
Patient states his pharmacy needs clarification on his new atorvastatin prescription before they will fill it. Please contact the pharmacy. Thanks.      Pharmacy:  [facility] Toshia Wu [phone] [place] [phone]

## 2018-02-23 ENCOUNTER — ANTI-COAG VISIT (OUTPATIENT)
Dept: PHARMACY | Facility: CLINIC | Age: 83
End: 2018-02-23

## 2018-02-23 DIAGNOSIS — I48.19 PERSISTENT ATRIAL FIBRILLATION (HCC): ICD-10-CM

## 2018-02-23 LAB — INTERNATIONAL NORMALIZATION RATIO, POC: 2.1

## 2018-02-28 PROBLEM — K81.9 CHOLECYSTITIS: Status: ACTIVE | Noted: 2018-02-28

## 2018-03-03 PROBLEM — B96.20 E COLI BACTEREMIA: Status: ACTIVE | Noted: 2018-03-03

## 2018-03-03 PROBLEM — R78.81 E COLI BACTEREMIA: Status: ACTIVE | Noted: 2018-03-03

## 2018-03-03 PROBLEM — K83.09 CHOLANGITIS: Status: ACTIVE | Noted: 2018-02-28

## 2018-03-04 ENCOUNTER — CARE COORDINATION (OUTPATIENT)
Dept: CASE MANAGEMENT | Age: 83
End: 2018-03-04

## 2018-03-05 ENCOUNTER — TELEPHONE (OUTPATIENT)
Dept: FAMILY MEDICINE CLINIC | Age: 83
End: 2018-03-05

## 2018-03-05 DIAGNOSIS — K92.1 BLACK STOOLS: Primary | ICD-10-CM

## 2018-03-05 NOTE — TELEPHONE ENCOUNTER
Pt calling has been on a lot of antibiotic for infection and states his stool is black.  Is this normal      Please advise  [phone]

## 2018-03-09 ENCOUNTER — OFFICE VISIT (OUTPATIENT)
Dept: FAMILY MEDICINE CLINIC | Age: 83
End: 2018-03-09

## 2018-03-09 ENCOUNTER — CARE COORDINATION (OUTPATIENT)
Dept: CASE MANAGEMENT | Age: 83
End: 2018-03-09

## 2018-03-09 VITALS
DIASTOLIC BLOOD PRESSURE: 72 MMHG | TEMPERATURE: 97.5 F | WEIGHT: 189.6 LBS | HEIGHT: 68 IN | SYSTOLIC BLOOD PRESSURE: 124 MMHG | BODY MASS INDEX: 28.73 KG/M2

## 2018-03-09 DIAGNOSIS — K92.1 BLACK STOOLS: ICD-10-CM

## 2018-03-09 LAB
ANISOCYTOSIS: ABNORMAL
ATYPICAL LYMPHOCYTE RELATIVE PERCENT: 3 % (ref 0–6)
BANDED NEUTROPHILS RELATIVE PERCENT: 3 % (ref 0–7)
BASOPHILS ABSOLUTE: 0.1 K/UL (ref 0–0.2)
BASOPHILS RELATIVE PERCENT: 1 %
BURR CELLS: ABNORMAL
EOSINOPHILS ABSOLUTE: 0.2 K/UL (ref 0–0.6)
EOSINOPHILS RELATIVE PERCENT: 3 %
HCT VFR BLD CALC: 41 % (ref 40.5–52.5)
HEMOGLOBIN: 14.2 G/DL (ref 13.5–17.5)
LYMPHOCYTES ABSOLUTE: 2 K/UL (ref 1–5.1)
LYMPHOCYTES RELATIVE PERCENT: 25 %
MCH RBC QN AUTO: 33.8 PG (ref 26–34)
MCHC RBC AUTO-ENTMCNC: 34.7 G/DL (ref 31–36)
MCV RBC AUTO: 97.4 FL (ref 80–100)
MONOCYTES ABSOLUTE: 1.2 K/UL (ref 0–1.3)
MONOCYTES RELATIVE PERCENT: 16 %
MYELOCYTE PERCENT: 2 %
NEUTROPHILS ABSOLUTE: 3.7 K/UL (ref 1.7–7.7)
NEUTROPHILS RELATIVE PERCENT: 47 %
OVALOCYTES: ABNORMAL
PDW BLD-RTO: 13.3 % (ref 12.4–15.4)
PLATELET # BLD: 249 K/UL (ref 135–450)
PMV BLD AUTO: 7.9 FL (ref 5–10.5)
RBC # BLD: 4.2 M/UL (ref 4.2–5.9)
WBC # BLD: 7.2 K/UL (ref 4–11)

## 2018-03-09 PROCEDURE — 99213 OFFICE O/P EST LOW 20 MIN: CPT | Performed by: INTERNAL MEDICINE

## 2018-03-09 PROCEDURE — 1111F DSCHRG MED/CURRENT MED MERGE: CPT | Performed by: INTERNAL MEDICINE

## 2018-03-09 PROCEDURE — G8417 CALC BMI ABV UP PARAM F/U: HCPCS | Performed by: INTERNAL MEDICINE

## 2018-03-09 PROCEDURE — G8482 FLU IMMUNIZE ORDER/ADMIN: HCPCS | Performed by: INTERNAL MEDICINE

## 2018-03-09 PROCEDURE — 1036F TOBACCO NON-USER: CPT | Performed by: INTERNAL MEDICINE

## 2018-03-09 PROCEDURE — 1123F ACP DISCUSS/DSCN MKR DOCD: CPT | Performed by: INTERNAL MEDICINE

## 2018-03-09 PROCEDURE — G8427 DOCREV CUR MEDS BY ELIG CLIN: HCPCS | Performed by: INTERNAL MEDICINE

## 2018-03-09 PROCEDURE — 4040F PNEUMOC VAC/ADMIN/RCVD: CPT | Performed by: INTERNAL MEDICINE

## 2018-03-09 PROCEDURE — G8598 ASA/ANTIPLAT THER USED: HCPCS | Performed by: INTERNAL MEDICINE

## 2018-03-09 ASSESSMENT — ENCOUNTER SYMPTOMS
ABDOMINAL PAIN: 0
APNEA: 0
SHORTNESS OF BREATH: 0
RHINORRHEA: 0
SINUS PAIN: 0
WHEEZING: 0
COUGH: 0

## 2018-03-09 NOTE — PROGRESS NOTES
daily except 2.5 mg on Mondays and Thursdays or as directed by the [facility] Anticoagulation clinic  #[phone].  Nutritional Supplements (BOOST PO) Take 0.5 Cans by mouth daily      Cholecalciferol (VITAMIN D PO) Take 5,000 Int'l Units by mouth daily      Multiple Vitamins-Minerals (EYE VITAMINS PO) Take by mouth daily      Handicap Placard MISC by Does not apply route 1 each 0    clopidogrel (PLAVIX) 75 MG tablet Take 75 mg by mouth daily.  nitroGLYCERIN (NITROSTAT) 0.4 MG SL tablet Place 0.4 mg under the tongue every 5 minutes as needed.  timolol (TIMOPTIC) 0.5 % ophthalmic solution 1 drop daily. No current facility-administered medications for this visit. Allergies: Sulfa antibiotics and Lipitor [atorvastatin]  Past Medical History:   Diagnosis Date    [facility] [address]     Atrial fibrillation (HCC)     CA - cancer of prostate     CAD (coronary artery disease) 2004    stents    Cancer ([facility] [address]     skin    Hyperlipidemia     Hypertension      Past Surgical History:   Procedure Laterality Date    APPENDECTOMY      CATARACT REMOVAL WITH IMPLANT Bilateral     CORONARY ANGIOPLASTY WITH STENT PLACEMENT      four    HERNIA REPAIR      TONSILLECTOMY      TYMPANOSTOMY TUBE PLACEMENT  2011    right     Family History   Problem Relation Age of Onset    Cancer Mother      colon     Social History   Substance Use Topics    Smoking status: Never Smoker    Smokeless tobacco: Never Used    Alcohol use Yes      Comment: very rare       Review of Systems   Constitutional: Negative for chills and diaphoresis. HENT: Negative for congestion, postnasal drip, rhinorrhea and sinus pain. Eyes: Negative for visual disturbance. Respiratory: Negative for apnea, cough, shortness of breath and wheezing. Cardiovascular: Negative for chest pain and palpitations. Gastrointestinal: Negative for abdominal pain.         Black stools   Genitourinary: Negative for dysuria, flank pain

## 2018-03-09 NOTE — CARE COORDINATION
[facility] Transitions Follow Up Call    3/9/2018    Patient: Sarah Finch  Patient : 1926   MRN: [id number]  Reason for Admission: There are no discharge diagnoses documented for the most recent discharge. Discharge Date: 3/3/18 RARS: Risk Score: 18.25       Spoke with: no one    Care Transitions Subsequent and Final Call    Subsequent and Final Calls  Care Transitions Interventions  Other Interventions: Follow Up: Unable to reach patient. Unable to leave message - no answer - no voicemail. CTC will follow.   Future Appointments  Date Time Provider Vishal Kirk   3/9/2018 11:45 AM [facility]   3/23/2018 9:30  [facility]  [address] ANTICOAG None   2018 2:15 PM MD Dino Marvin RN

## 2018-03-19 ENCOUNTER — CARE COORDINATION (OUTPATIENT)
Dept: CASE MANAGEMENT | Age: 83
End: 2018-03-19

## 2018-03-23 ENCOUNTER — ANTI-COAG VISIT (OUTPATIENT)
Dept: PHARMACY | Facility: CLINIC | Age: 83
End: 2018-03-23

## 2018-03-23 LAB — INTERNATIONAL NORMALIZATION RATIO, POC: 2

## 2018-04-06 ENCOUNTER — TELEPHONE (OUTPATIENT)
Dept: PHARMACY | Facility: CLINIC | Age: 83
End: 2018-04-06

## 2018-04-10 ENCOUNTER — TELEPHONE (OUTPATIENT)
Dept: CARDIOLOGY CLINIC | Age: 83
End: 2018-04-10

## 2018-04-18 ENCOUNTER — TELEPHONE (OUTPATIENT)
Dept: PHARMACY | Facility: CLINIC | Age: 83
End: 2018-04-18

## 2018-04-30 PROBLEM — E86.0 DEHYDRATION: Status: ACTIVE | Noted: 2018-04-30

## 2018-05-02 ENCOUNTER — TELEPHONE (OUTPATIENT)
Dept: FAMILY MEDICINE CLINIC | Age: 83
End: 2018-05-02

## 2018-05-02 NOTE — TELEPHONE ENCOUNTER
Tanvir Elyjuan pt daughter wanted you aware that pt had stroke on 4/6/18 has been in  & now [facility].  He is going into Hospice care this Friday at his home.       Jose Gtram Jia,   [phone]

## 2018-05-04 ENCOUNTER — TELEPHONE (OUTPATIENT)
Dept: FAMILY MEDICINE CLINIC | Age: 83
End: 2018-05-04

## 2018-05-04 PROBLEM — N17.9 AKI (ACUTE KIDNEY INJURY) (HCC): Status: ACTIVE | Noted: 2018-05-04

## 2018-05-04 PROBLEM — E87.0 HYPERNATREMIA: Status: ACTIVE | Noted: 2018-05-04

## 2018-05-25 ENCOUNTER — TELEPHONE (OUTPATIENT)
Dept: FAMILY MEDICINE CLINIC | Age: 83
End: 2018-05-25

## 2018-05-30 PROBLEM — E86.0 DEHYDRATION: Status: RESOLVED | Noted: 2018-04-30 | Resolved: 2018-05-30

## 2018-06-07 ENCOUNTER — TELEPHONE (OUTPATIENT)
Dept: PHARMACY | Facility: CLINIC | Age: 83
End: 2018-06-07

## 2018-06-07 ENCOUNTER — TELEPHONE (OUTPATIENT)
Dept: CARDIOLOGY CLINIC | Age: 83
End: 2018-06-07

## 2018-12-28 ENCOUNTER — ANTI-COAG VISIT (OUTPATIENT)
Dept: PHARMACY | Age: 83
End: 2018-12-28